# Patient Record
Sex: FEMALE | Race: BLACK OR AFRICAN AMERICAN | NOT HISPANIC OR LATINO | Employment: UNEMPLOYED | URBAN - METROPOLITAN AREA
[De-identification: names, ages, dates, MRNs, and addresses within clinical notes are randomized per-mention and may not be internally consistent; named-entity substitution may affect disease eponyms.]

---
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
## 2021-01-20 ENCOUNTER — HOSPITAL ENCOUNTER (EMERGENCY)
Facility: HOSPITAL | Age: 21
Discharge: HOME | End: 2021-01-20
Attending: EMERGENCY MEDICINE | Admitting: EMERGENCY MEDICINE
Payer: COMMERCIAL

## 2021-01-20 VITALS
HEIGHT: 65 IN | DIASTOLIC BLOOD PRESSURE: 74 MMHG | SYSTOLIC BLOOD PRESSURE: 114 MMHG | HEART RATE: 94 BPM | OXYGEN SATURATION: 98 % | BODY MASS INDEX: 23.07 KG/M2 | WEIGHT: 138.45 LBS | TEMPERATURE: 99 F | RESPIRATION RATE: 18 BRPM

## 2021-01-20 DIAGNOSIS — Z11.52 ENCOUNTER FOR SCREENING FOR COVID-19: ICD-10-CM

## 2021-01-20 DIAGNOSIS — B34.9 VIRAL SYNDROME: Primary | ICD-10-CM

## 2021-01-20 DIAGNOSIS — J02.9 SORE THROAT: ICD-10-CM

## 2021-01-20 LAB — S PYO DNA THROAT QL NAA+PROBE: NORMAL

## 2021-01-20 PROCEDURE — 99283 EMERGENCY DEPT VISIT LOW MDM: CPT

## 2021-01-20 PROCEDURE — 87651 STREP A DNA AMP PROBE: CPT | Performed by: PHYSICIAN ASSISTANT

## 2021-01-20 PROCEDURE — 87637 SARSCOV2&INF A&B&RSV AMP PRB: CPT | Performed by: PHYSICIAN ASSISTANT

## 2021-01-20 PROCEDURE — 99284 EMERGENCY DEPT VISIT MOD MDM: CPT | Performed by: PHYSICIAN ASSISTANT

## 2021-01-20 RX ORDER — LIDOCAINE HYDROCHLORIDE 20 MG/ML
15 SOLUTION OROPHARYNGEAL ONCE
Status: COMPLETED | OUTPATIENT
Start: 2021-01-20 | End: 2021-01-20

## 2021-01-20 RX ADMIN — LIDOCAINE HYDROCHLORIDE 15 ML: 20 SOLUTION ORAL; TOPICAL at 20:58

## 2021-01-21 NOTE — DISCHARGE INSTRUCTIONS
You will be notified of COVID test results by telephone call  Encourage self quarantine 10 days from symptom onset  You will be notified of positive strep screen by telephone call  Encourage adequate hydration, warm fluids w/ honey  Take robitussin as needed for cough  Return immediately to the ED for new or worsening symptoms

## 2021-01-21 NOTE — ED PROVIDER NOTES
History  Chief Complaint   Patient presents with    Flu Symptoms     pt c/o body aches, chills and generilized weakness since yesterday  Joshua Harley is an otherwise healthy and well-appearing 23-year-old female arriving ambulatory to the emergency department for evaluation of constitutional complaints including fatigue, body aches, sore throat, and headache beginning yesterday  She reports generalized frontal headache of gradual onset, denies sudden onset maximal intensity headache  States she has a hx migraine headaches admitting this headache is lessened in intensity when compared to her migraines  She denies any focal neurologic deficits  Patient reports right-sided neck discomfort for which she had gotten a chiropractor adjustment today hoping this would improve her body aches though there was not much improvement  She admits to generalized sore throat x 1 day as well  She has a history of strep pharyngitis, relating that her symptoms are not as significant as compared to being diagnosed with strep  She denies fevers but admits to chills  She reports intermittent cough productive for clear, yellow phlegm  She denies any known sick contact or covid exposures  Patient has not tried anything for symptom relief  She denies history of asthma or difficulty breathing  She admits to decrease appetite but denies abd pain, n/v/d, loss of sense of taste or smell  History provided by:  Patient  Flu Symptoms  Presenting symptoms: cough, headache, myalgias (generlized body aches) and sore throat    Presenting symptoms: no diarrhea, no fatigue, no fever, no nausea, no shortness of breath and no vomiting    Severity:  Mild  Onset quality:  Gradual  Duration:  1 day  Progression:  Unchanged  Relieved by:  None tried  Ineffective treatments:  None tried  Associated symptoms: chills    Associated symptoms: no ear pain, no congestion and no neck stiffness        None       History reviewed   No pertinent past medical history  History reviewed  No pertinent surgical history  History reviewed  No pertinent family history  I have reviewed and agree with the history as documented  E-Cigarette/Vaping    E-Cigarette Use Never User      E-Cigarette/Vaping Substances     Social History     Tobacco Use    Smoking status: Never Smoker    Smokeless tobacco: Never Used   Substance Use Topics    Alcohol use: Never     Frequency: Never    Drug use: Yes     Types: Marijuana       Review of Systems   Constitutional: Positive for appetite change and chills  Negative for activity change, fatigue and fever  HENT: Positive for sneezing and sore throat  Negative for congestion, ear discharge and ear pain  Eyes: Negative for visual disturbance  Respiratory: Positive for cough  Negative for chest tightness, shortness of breath and wheezing  Gastrointestinal: Negative for abdominal pain, diarrhea, nausea and vomiting  Musculoskeletal: Positive for myalgias (generlized body aches)  Negative for neck stiffness  Skin: Negative for rash  Neurological: Positive for headaches  Negative for weakness and numbness  All other systems reviewed and are negative  Physical Exam  Physical Exam  Vitals signs and nursing note reviewed  Constitutional:       General: She is not in acute distress  Appearance: Normal appearance  She is well-developed  She is not ill-appearing, toxic-appearing or diaphoretic  Comments: Pleasant and well-appearing 20yo female, seated comfortably on exam bed in no apparent distress   HENT:      Head: Normocephalic and atraumatic  Jaw: There is normal jaw occlusion  Right Ear: Hearing, tympanic membrane, ear canal and external ear normal  No tenderness  Tympanic membrane is not erythematous or bulging  Left Ear: Hearing, tympanic membrane, ear canal and external ear normal  No tenderness  Tympanic membrane is not erythematous or bulging  Mouth/Throat:      Lips: Pink  Mouth: Mucous membranes are moist       Pharynx: Oropharynx is clear  No pharyngeal swelling, oropharyngeal exudate, posterior oropharyngeal erythema or uvula swelling  Tonsils: No tonsillar exudate  2+ on the right  2+ on the left  Comments: Bilateral tonsillar hypertrophy, no erythema or exudate  No pharyngeal erythema  Uvula midline, no uvula or soft palate deviation  Eyes:      General: Lids are normal  No visual field deficit  Extraocular Movements: Extraocular movements intact  Right eye: Normal extraocular motion and no nystagmus  Left eye: Normal extraocular motion and no nystagmus  Conjunctiva/sclera: Conjunctivae normal       Right eye: Right conjunctiva is not injected  Left eye: Left conjunctiva is not injected  Pupils: Pupils are equal, round, and reactive to light  Neck:      Musculoskeletal: Full passive range of motion without pain, normal range of motion and neck supple  Normal range of motion  Muscular tenderness present  No pain with movement or spinous process tenderness  Comments: Right-sided trapezium and paraspinal tenderness on palpation, no midline tenderness  No meningismus  Cardiovascular:      Rate and Rhythm: Normal rate and regular rhythm  Pulses: Normal pulses  Heart sounds: Normal heart sounds  No murmur  Pulmonary:      Effort: Pulmonary effort is normal  No respiratory distress  Breath sounds: Normal breath sounds  No wheezing  Abdominal:      General: Bowel sounds are normal  There is no distension  Palpations: Abdomen is soft  Tenderness: There is no abdominal tenderness  Musculoskeletal: Normal range of motion  General: No tenderness  Lymphadenopathy:      Cervical: No cervical adenopathy  Skin:     General: Skin is warm and dry  Capillary Refill: Capillary refill takes less than 2 seconds  Neurological:      General: No focal deficit present        Mental Status: She is alert and oriented to person, place, and time  Mental status is at baseline  Sensory: Sensation is intact  No sensory deficit  Motor: Motor function is intact  Gait: Gait is intact  Comments: No focal deficits         Vital Signs  ED Triage Vitals [01/20/21 1943]   Temperature Pulse Respirations Blood Pressure SpO2   99 °F (37 2 °C) 94 18 114/74 98 %      Temp Source Heart Rate Source Patient Position - Orthostatic VS BP Location FiO2 (%)   Tympanic Monitor Sitting Left arm --      Pain Score       6           Vitals:    01/20/21 1943   BP: 114/74   Pulse: 94   Patient Position - Orthostatic VS: Sitting         Visual Acuity      ED Medications  Medications   Lidocaine Viscous HCl (XYLOCAINE) 2 % mucosal solution 15 mL (15 mL Swish & Swallow Given 1/20/21 2058)       Diagnostic Studies  Results Reviewed     Procedure Component Value Units Date/Time    Strep A PCR [[id number]] Collected: 01/20/21 2058    Lab Status: In process Specimen: Throat Updated: 01/20/21 2110    Novel Coronavirus 2019(COVID-19), Influenza A/B, RSV [facility] [[id number]] Collected: 01/20/21 2058    Lab Status: In process Specimen: Nasopharyngeal Swab Updated: 01/20/21 2110                 No orders to display              Procedures  Procedures         ED Course                       MDM  Number of Diagnoses or Management Options  Encounter for screening for COVID-19:   Sore throat:   Viral syndrome: new and requires workup  Diagnosis management comments: This is an otherwise healthy and well-appearing 52-year-old female arriving ambulatory to the emergency department for evaluation constitutional complaints including sore throat, headache, body aches x1 day  Patient reports gradual onset of mild frontal headache  She has a history of migraine headaches admitting today's headache is less in intensity when compared to her migraines  She denies blurred vision, photophobia, phonophobia or focal neurologic deficits   Patient also reports generalized sore throat  She denies fevers but admits to chills  She reports cough productive for clear/yellow phlegm  She does admit to decreased appetite, but denies abdominal pain, nausea, vomiting, diarrhea, change or loss in sense of taste or smell  She has not tried anything for symptom relief less far  She states that she has not had any known sick contacts  She denies recent travel  Differential diagnosis includes but not limited to: pharyngitis, viral syndrome, URI, COVID  Do not suspect meningeal or bacteremic process at this time    Initial ED plan: COVID swab, strep test, discharge    Final ED Assessment: Vital signs stable on hospital arrival, examination as documented above  Exam and vital signs clinically reassuring overall  Centor score 0  Will defer antibiotics at this time while awaiting results of strep swab  COVID swab obtained and pending  Patient encouraged to quarantine 10 days from symptom onset per current CDC guidelines  Patient was understanding and agreeable with disposition plan  Encouraged adequate hydration including warm fluids with honey for sore throat, robitussin for cough, and tylenol/ibuprofen for fevers and body aches  PCP follow up if symptoms persist  Patient encouraged to return to the ED with new or worsening symptoms  She has been monitored in the ED for greater than one hour without new or worsening symptoms  She is stable for discharge home at this time in good condition            Amount and/or Complexity of Data Reviewed  Clinical lab tests: ordered  Review and summarize past medical records: yes  Independent visualization of images, tracings, or specimens: yes    Risk of Complications, Morbidity, and/or Mortality  Presenting problems: low  Diagnostic procedures: low  Management options: low    Patient Progress  Patient progress: stable      Disposition  Final diagnoses:   Viral syndrome   Encounter for screening for COVID-19   Sore throat     Time reflects when diagnosis was documented in both MDM as applicable and the Disposition within this note     Time User Action Codes Description Comment    1/20/2021  8:52 PM Anthonette Burows Add [B34 9] Viral syndrome     1/20/2021  8:53 PM Anthonette Burows Add [Z11 52] Encounter for screening for COVID-19     1/20/2021  8:56 PM Anthonette Burows Add [J02 9] Sore throat       ED Disposition     ED Disposition Condition Date/Time Comment    Discharge Stable Wed Jan 20, 2021  8:52 PM Kj Keating discharge to home/self care  Follow-up Information     Follow up With Specialties Details Why Contact Info Additional Information    Your PCP   As needed      [address] [facility] Emergency [facility] Emergency Medicine  If symptoms worsen [address] [facility] [id number] [facility] Emergency Department, [address], [place], [facility]          Patient's Medications    No medications on file     No discharge procedures on file      PDMP Review     None          ED Provider  Electronically Signed by           Dorian Matias PA-C  01/20/21 [id number]

## 2021-01-22 LAB
FLUAV RNA NPH QL NAA+PROBE: NOT DETECTED
FLUBV RNA NPH QL NAA+PROBE: NOT DETECTED
RSV RNA NPH QL NAA+PROBE: NOT DETECTED
SARS-COV-2 RNA NPH QL NAA+PROBE: DETECTED

## 2021-01-23 NOTE — RESULT ENCOUNTER NOTE
Patient's mother called back for test results for her daughter  I updated the mother of the positive test and need to isolate  Also spoke w Franklin Ortiz who was available and discussed need to isolate for 10 days since symptom onset and must be fever free for 24 hours wo antipyretics prior to coming out of isolation w improvement in symptoms

## 2021-01-23 NOTE — RESULT ENCOUNTER NOTE
Patient's VM is full  Option to send an SMS from the hospital phone so this was done and our number provided for callback although not able to verbalize any "speech to text  "